# Patient Record
Sex: MALE | Race: WHITE | Employment: FULL TIME | URBAN - METROPOLITAN AREA
[De-identification: names, ages, dates, MRNs, and addresses within clinical notes are randomized per-mention and may not be internally consistent; named-entity substitution may affect disease eponyms.]

---

## 2019-06-12 ENCOUNTER — OFFICE VISIT (OUTPATIENT)
Dept: URGENT CARE | Facility: RETAIL CLINIC | Age: 29
End: 2019-06-12

## 2019-06-12 VITALS
TEMPERATURE: 98.3 F | DIASTOLIC BLOOD PRESSURE: 91 MMHG | SYSTOLIC BLOOD PRESSURE: 133 MMHG | OXYGEN SATURATION: 100 % | HEART RATE: 105 BPM

## 2019-06-12 DIAGNOSIS — F41.0 ANXIETY ATTACK: Primary | ICD-10-CM

## 2019-06-12 PROCEDURE — 99213 OFFICE O/P EST LOW 20 MIN: CPT | Performed by: FAMILY MEDICINE

## 2019-06-12 RX ORDER — HYDROXYZINE PAMOATE 25 MG/1
CAPSULE ORAL
Refills: 0 | COMMUNITY
Start: 2018-07-25 | End: 2019-08-22

## 2019-06-12 RX ORDER — BUPROPION HYDROCHLORIDE 150 MG/1
TABLET, EXTENDED RELEASE ORAL
Refills: 5 | COMMUNITY
Start: 2019-05-22

## 2019-06-12 RX ORDER — ALPRAZOLAM 0.25 MG
0.25 TABLET ORAL 3 TIMES DAILY PRN
Qty: 30 TABLET | Refills: 0 | Status: SHIPPED | OUTPATIENT
Start: 2019-06-12 | End: 2019-08-14

## 2019-06-12 NOTE — LETTER
[facility]  [address]  [facility] [address]  Phone: [phone]    June 12, 2019        Ruslan Powell  [address]  [facility] [address]          To whom it may concern:    RE: Ruslan Powell    Patient was seen and treated today at our clinic and missed work.    Please contact me for questions or concerns.      Sincerely,        Gino Lanier MD

## 2019-06-12 NOTE — PROGRESS NOTES
SUBJECTIVE:  Patient presents with acute anxiety worse the last two nights. Wakes and can't get back to sleep.  Feels sob and occasion chest discomfort.  No cough or fever.  Hx of depression and anxiety.  Takes vistaril but makes him sleepy.  Works as a  and new job soon.  Welds for enjoyment as well as work.  Describes himself as loner but does have a girlfriend.  Mother is alcoholic and lives with father in [facility].  Has not seen parents in 4 years.  Cristobal avoids ETOH.    No past medical history on file.  Current Outpatient Medications   Medication Sig Dispense Refill     ALPRAZolam (XANAX) 0.25 MG tablet Take 1 tablet (0.25 mg) by mouth 3 times daily as needed for anxiety 30 tablet 0     buPROPion (WELLBUTRIN SR) 150 MG 12 hr tablet TAKE ONE TABLET BY MOUTH ONCE DAILY FOR 3 DAYS THEN INCREASE TO TWO TIMES A DAY  5     hydrOXYzine (VISTARIL) 25 MG capsule TAKE TWO CAPSULES BY MOUTH THREE TIMES A DAY AS NEEDED FOR UP TO 30 DAYS.  0     History   Smoking Status     Current Some Day Smoker   Smokeless Tobacco     Current User     Types: Chew         OBJECITVE;  BP (!) 133/91   Pulse 105   Temp 98.3  F (36.8  C) (Tympanic)   SpO2 100%   Appears moderately ill but not toxic.  EARS:  normal.  THROAT AND PHARYNX:  normal.  NECK: supple; no adenopathy in the neck.  SINUSES: non tender.  CHEST:  clear.    ASSESSMENT:  Acute and chronic anxiety.  Depression fair control on Welbutrin rxed in [place]    PLAN:  Symptomatic therapy suggested: xanax 0.25 mg tid prn acute anxiety.    Follow up with primary care provider  6/21.

## 2019-08-14 ENCOUNTER — HOSPITAL ENCOUNTER (EMERGENCY)
Facility: CLINIC | Age: 29
Discharge: HOME | End: 2019-08-14
Attending: FAMILY MEDICINE | Admitting: FAMILY MEDICINE

## 2019-08-14 VITALS
TEMPERATURE: 97.6 F | WEIGHT: 140 LBS | SYSTOLIC BLOOD PRESSURE: 120 MMHG | DIASTOLIC BLOOD PRESSURE: 76 MMHG | OXYGEN SATURATION: 99 % | RESPIRATION RATE: 18 BRPM

## 2019-08-14 DIAGNOSIS — F41.0 ANXIETY ATTACK: ICD-10-CM

## 2019-08-14 DIAGNOSIS — F41.9 ANXIETY: ICD-10-CM

## 2019-08-14 PROCEDURE — 99283 EMERGENCY DEPT VISIT LOW MDM: CPT

## 2019-08-14 PROCEDURE — 99284 EMERGENCY DEPT VISIT MOD MDM: CPT | Mod: Z6 | Performed by: FAMILY MEDICINE

## 2019-08-14 RX ORDER — ALPRAZOLAM 0.25 MG
0.25 TABLET ORAL 3 TIMES DAILY PRN
Qty: 15 TABLET | Refills: 0 | Status: SHIPPED | OUTPATIENT
Start: 2019-08-14 | End: 2019-08-22

## 2019-08-14 NOTE — ED AVS SNAPSHOT
[facility] Emergency Department  [address] [place] [address]  Phone:  [phone]  Fax:  [phone]                                    Ruslan Powell   MRN: [id number]    Department:  [facility] Emergency Department   Date of Visit:  8/14/2019           After Visit Summary Signature Page    I have received my discharge instructions, and my questions have been answered. I have discussed any challenges I see with this plan with the nurse or doctor.    ..........................................................................................................................................  Patient/Patient Representative Signature      ..........................................................................................................................................  Patient Representative Print Name and Relationship to Patient    ..................................................               ................................................  Date                                   Time    ..........................................................................................................................................  Reviewed by Signature/Title    ...................................................              ..............................................  Date                                               Time          22EPIC Rev 08/18

## 2019-08-14 NOTE — ED PROVIDER NOTES
"  History     Chief Complaint   Patient presents with     Anxiety     The history is provided by the patient.     Ruslan Powell is a 29 year old male who presents to the emergency department with anxiety. The patient states that his anxiety has increased the last couple days. He states that he has \"felt like a lunatic.\" The patient needed to lay on the floor with a blanket over his head today to decrease anxiety. He feels his mood swings have worsened. The patient has a prescription for Wellbutrin and Xanax. He has limited amounts of pills left of his Xanax. He states that the Xanax helps significantly with his anxiety. He typically takes a Xanax 1 time a week. He took a Xanax today. The patient does not think the Wellbutrin has helped decrease his anxiety. He has been taking Wellbutrin inconsistently for the last 7 years. The patient denies any drug or alcohol use. The patient does not have any homicidal or suicidal thoughts. He feels safe at home and does not have any firearms in his home. The patient's partner is attempting to get the patient talk therapy.     Allergies:  No Known Allergies    Problem List:    There are no active problems to display for this patient.       Past Medical History:    No past medical history on file.    Past Surgical History:    No past surgical history on file.    Family History:    No family history on file.    Social History:  Marital Status:  Single [1]  Social History     Tobacco Use     Smoking status: Current Some Day Smoker     Smokeless tobacco: Current User     Types: Chew   Substance Use Topics     Alcohol use: Not on file     Drug use: Not on file        Medications:      ALPRAZolam (XANAX) 0.25 MG tablet   buPROPion (WELLBUTRIN SR) 150 MG 12 hr tablet   hydrOXYzine (VISTARIL) 25 MG capsule         Review of Systems   All other systems reviewed and are negative.      Physical Exam   BP: 120/76  Heart Rate: 89  Temp: 97.6  F (36.4  C)  Resp: 18  Weight: 63.5 kg (140 " lb)  SpO2: 99 %      Physical Exam   Constitutional: He appears well-developed and well-nourished. No distress.   HENT:   Head: Normocephalic and atraumatic.   Eyes: Pupils are equal, round, and reactive to light. No scleral icterus.   Neck: Normal range of motion.   Cardiovascular: Normal rate.   Pulmonary/Chest: Effort normal. No respiratory distress.   Abdominal: He exhibits no distension and no mass. There is no guarding.   Musculoskeletal: Normal range of motion. He exhibits no edema, tenderness or deformity.   Neurological: He is alert. No cranial nerve deficit.   Skin: Skin is warm and dry. No rash noted. He is not diaphoretic.   Psychiatric:   Anxious appearing.   Nursing note and vitals reviewed.      ED Course        Procedures         Medications - No data to display  Patient was adamant that she was not suicidal or homicidal.  We had a long discussion on treatment for the patient's anxiety and that patient probably needs to be on some type of controller medication.  Patient does not have an appointment set up in the clinic so I did set one up for him here today.  I did give him a few more Xanax to get through for the next few days but did stress to him that this is not a good long-term option.  I also recommend patient needs to look into possible counseling.    Assessments & Plan (with Medical Decision Making)  Anxiety     I have reviewed the nursing notes.    I have reviewed the findings, diagnosis, plan and need for follow up with the patient.      Discharge Medication List as of 8/14/2019  4:12 PM          Final diagnoses:   Anxiety   This document serves as a record of services personally performed by José Miguel Tang, *. It was created on their behalf by Rosa Rivas, a trained medical scribe. The creation of this record is based on the provider's personal observations and the statements of the patient. This document has been checked and approved by the attending provider.  Note: Chart  documentation done in part with Dragon Voice Recognition software. Although reviewed after completion, some word and grammatical errors may remain.    8/14/2019   [facility] EMERGENCY DEPARTMENT     José Miguel Tang MD  08/16/19 0679

## 2019-08-22 ENCOUNTER — OFFICE VISIT (OUTPATIENT)
Dept: FAMILY MEDICINE | Facility: OTHER | Age: 29
End: 2019-08-22

## 2019-08-22 VITALS
HEIGHT: 78 IN | SYSTOLIC BLOOD PRESSURE: 130 MMHG | BODY MASS INDEX: 16.78 KG/M2 | HEART RATE: 108 BPM | DIASTOLIC BLOOD PRESSURE: 74 MMHG | TEMPERATURE: 99 F | OXYGEN SATURATION: 100 % | WEIGHT: 145 LBS | RESPIRATION RATE: 16 BRPM

## 2019-08-22 DIAGNOSIS — F41.9 ANXIETY: Primary | ICD-10-CM

## 2019-08-22 PROCEDURE — 99213 OFFICE O/P EST LOW 20 MIN: CPT | Performed by: INTERNAL MEDICINE

## 2019-08-22 RX ORDER — BUSPIRONE HYDROCHLORIDE 10 MG/1
10 TABLET ORAL 3 TIMES DAILY
Qty: 60 TABLET | Refills: 0 | Status: SHIPPED | OUTPATIENT
Start: 2019-08-22

## 2019-08-22 ASSESSMENT — PAIN SCALES - GENERAL: PAINLEVEL: NO PAIN (0)

## 2019-08-22 ASSESSMENT — MIFFLIN-ST. JEOR: SCORE: 2051.72

## 2019-08-22 NOTE — PROGRESS NOTES
Subjective     Ruslan Powell is a 29 year old male who presents to clinic today for the following health issues:    HPI   ED/UC Followup:    Facility:  [facility]  Date of visit: 8/14/219  Reason for visit: Anxiety attacks  Current Status: improved                          Chief Complaint         The patient is a 29-year-old male who is essentially new to the clinic.  He has a history of anxiety going back several years.  The records available from last year at an outside facility suggest complaints of anxiety and some depression.  He denies any suicidal ideation.  He was recently given hydroxyzine and notes that this did nothing.  He was more recently given Xanax and notes that he would like a larger dose.  He states the ER physician told him that he should come to me to get a bigger dose when it is time.  He believes that time is now.  Review of the records suggests that the emergency department physician appropriately suggested that the Xanax was a short-term fix and that the patient should seek counseling.  Notably, he states that he does not have insurance and is unable to seek counseling at this time.  He has contacted [facility] .  I have explained to the patient that I will not be giving him a higher dose of Xanax.  He has a history of alcoholism and a family history of schizophrenia.  I believe counseling is his best bet.  In the meantime, I believe BuSpar may be of some benefit.  It is essentially nonaddictive and should help with his anxiety somewhat.  He should continue his Wellbutrin at the 150 mg twice daily dosage.                       PAST, FAMILY,SOCIAL HISTORY:     Medical  History:   has no past medical history on file.     Surgical History:   has no past surgical history on file.     Social History:   reports that he quit smoking about 2 months ago. He has quit using smokeless tobacco. His smokeless tobacco use included chew. He reports that he drinks alcohol. He reports that  he does not use drugs.     Family History:  family history is not on file.            MEDICATIONS  Current Outpatient Medications   Medication Sig Dispense Refill     buPROPion (WELLBUTRIN SR) 150 MG 12 hr tablet TAKE ONE TABLET BY MOUTH ONCE DAILY FOR 3 DAYS THEN INCREASE TO TWO TIMES A DAY  5     busPIRone (BUSPAR) 10 MG tablet Take 1 tablet (10 mg) by mouth 3 times daily 60 tablet 0         --------------------------------------------------------------------------------------------------------------------                              Review of Systems       LUNGS: Pt denies: cough, excess sputum, hemoptysis, or shortness of breath.   HEART: Pt denies: chest pain, arrhythmia, syncope, tachy or bradyarrhythmia.   GI: Pt denies: nausea, vomiting, diarrhea, constipation, melena, or hematochezia.   NEURO: Pt denies: seizures, strokes, diplopia, weakness, paraesthesias, or paralysis.   SKIN: Pt denies: itching, rashes, discoloration, or specific lesions of concern. Denies recent hair loss.   PSYCH: The patient denies significant depression.  He does complain of anxiety and mood imbalance. Specifically denies any suicidal ideation.                                     Examination    /74 (BP Location: Left arm, Patient Position: Sitting, Cuff Size: Adult Regular)   Pulse 108   Temp 99  F (37.2  C) (Temporal)   Resp 16   Ht 2.438 m (8')   Wt 65.8 kg (145 lb)   SpO2 100%   BMI 11.06 kg/m       Constitutional: The patient appears to be in no acute distress. The patient appears to be adequately hydrated. No acute respiratory or hemodynamic distress is noted at this time.   LUNGS: clear bilaterally, airflow is brisk, no intercostal retraction or stridor is noted. No coughing is noted during visit.   HEART:  regular without rubs, clicks, gallops, or murmurs. PMI is nondisplaced. Upstrokes are brisk. S1,S2 are heard.   GI: Abdomen is soft, without rebound, guarding or tenderness. Bowel sounds are appropriate. No  renal bruits are heard.   NEURO: Pt is alert and appropriate. No neurologic lateralization is noted. Cranial nerves 2-12 are intact. Peripheral sensory and motor function are grossly normal.    SKIN:  warm and dry. No erythema, or rashes are noted. No specific lesions of concern are noted.    PSYCH: The patient appears grossly appropriate. Maintains poor eye contact, does not have any jittery or atypical motion. Displays appropriate affect.                                             Decision Making    1. Anxiety  Patient will seek counseling when his finances allow.   - busPIRone (BUSPAR) 10 MG tablet; Take 1 tablet (10 mg) by mouth 3 times daily  Dispense: 60 tablet; Refill: 0   Recommend he follows up with family practice.                               FOLLOW UP   I have asked the patient to make an appointment for followup the provider of his choice in the next 2 weeks        I have carefully explained the diagnosis and treatment options to the patient.  The patient has displayed an understanding of the above, and all subsequent questions were answered.      DO JEFFRY Martin    Portions of this note were produced using [employer]  Although every attempt at real-time proof reading has been made, occasional grammar/syntax errors may have been missed.